# Patient Record
Sex: FEMALE | Race: WHITE
[De-identification: names, ages, dates, MRNs, and addresses within clinical notes are randomized per-mention and may not be internally consistent; named-entity substitution may affect disease eponyms.]

---

## 2022-03-06 ENCOUNTER — HOSPITAL ENCOUNTER (EMERGENCY)
Dept: HOSPITAL 26 - MED | Age: 22
Discharge: HOME | End: 2022-03-06
Payer: MEDICAID

## 2022-03-06 VITALS — WEIGHT: 150 LBS | HEIGHT: 66 IN | BODY MASS INDEX: 24.11 KG/M2

## 2022-03-06 VITALS — SYSTOLIC BLOOD PRESSURE: 122 MMHG | DIASTOLIC BLOOD PRESSURE: 81 MMHG

## 2022-03-06 DIAGNOSIS — Z88.8: ICD-10-CM

## 2022-03-06 DIAGNOSIS — L03.115: ICD-10-CM

## 2022-03-06 DIAGNOSIS — Z79.899: ICD-10-CM

## 2022-03-06 DIAGNOSIS — E06.3: ICD-10-CM

## 2022-03-06 DIAGNOSIS — L02.415: Primary | ICD-10-CM

## 2022-03-06 NOTE — NUR
20 y/o female presents to ed with c/o right hip abscess that started 6 days 
ago. pt was seen by dermotologist and was informed to do warm compresses. pt 
states since warm compress, abscess has opened and started draining, upon 
assessment, pt has small open wound with thick white/yellow discharge. no 
bleeding or edema around site. denies nausea, vomiting, diarrhea. skin is 
pink/warm/dry. a&o x4 with even and steady gait. lungs clear bl, heart rate 
even and regular. pt denies any fever, cp, sob, or cough at this time. pt 
states pain is 4/10 at this time. vss. patient positioned for comfort. hob 
elevated. bed down. ermd made aware of pt.



pmh: hashimoto, Postural orthostatic tachycardia syndrome (POTS), fibromyalgia

allergy: bee stings, APRI

## 2022-03-06 NOTE — NUR
Patient discharged with v/s stable. Written and verbal after care instructions 
given and explained. 

Patient alert, oriented and verbalized understanding of instructions. 
Ambulatory with steady gait. All questions addressed prior to discharge. ID 
band removed. Patient advised to follow up with PMD. Rx of sulfamethoxazole 
(sent) given. Patient educated on indication of medication including possible 
reaction and side effects. Opportunity to ask questions provided and answered.

## 2022-11-10 ENCOUNTER — HOSPITAL ENCOUNTER (EMERGENCY)
Dept: HOSPITAL 26 - MED | Age: 22
Discharge: HOME | End: 2022-11-10
Payer: MEDICAID

## 2022-11-10 VITALS — DIASTOLIC BLOOD PRESSURE: 72 MMHG | SYSTOLIC BLOOD PRESSURE: 113 MMHG

## 2022-11-10 VITALS — BODY MASS INDEX: 25.39 KG/M2 | HEIGHT: 66 IN | WEIGHT: 158 LBS

## 2022-11-10 DIAGNOSIS — M79.671: Primary | ICD-10-CM

## 2022-11-10 DIAGNOSIS — Z79.899: ICD-10-CM

## 2022-11-10 DIAGNOSIS — Z88.8: ICD-10-CM

## 2022-11-10 NOTE — NUR
22/F WALKED IN C/O RIGHT FOOT PAIN ONSET 5 DAYS. PT DENIES ANY FALL OR INJURY 
BUT STATES PT IS FREQUENTLY BOARD DIVING. AAO4, AMBULATORY, ROM INTACT, NO 
SWELLING OR DEFORMITY NOTED. 





pmh: POTS, fibromyalgia, hashimotos, sleep apnea

allergy: bee sting, desogestrel, ethinyl, estradiol

## 2023-04-18 ENCOUNTER — HOSPITAL ENCOUNTER (EMERGENCY)
Dept: HOSPITAL 26 - MED | Age: 23
Discharge: HOME | End: 2023-04-18
Payer: MEDICAID

## 2023-04-18 VITALS — HEIGHT: 65 IN | WEIGHT: 155 LBS | BODY MASS INDEX: 25.83 KG/M2

## 2023-04-18 VITALS — DIASTOLIC BLOOD PRESSURE: 72 MMHG | SYSTOLIC BLOOD PRESSURE: 122 MMHG

## 2023-04-18 VITALS — DIASTOLIC BLOOD PRESSURE: 93 MMHG | SYSTOLIC BLOOD PRESSURE: 132 MMHG

## 2023-04-18 DIAGNOSIS — Z88.8: ICD-10-CM

## 2023-04-18 DIAGNOSIS — Z91.030: ICD-10-CM

## 2023-04-18 DIAGNOSIS — J03.90: Primary | ICD-10-CM

## 2023-04-18 DIAGNOSIS — Z79.2: ICD-10-CM

## 2023-04-18 PROCEDURE — 99283 EMERGENCY DEPT VISIT LOW MDM: CPT

## 2023-04-18 NOTE — NUR
21 Y/O f PRESENTS WITH COLD SYMPTOMS X12 DAYS. PT STATED THROAT HAS BEEN 
INFLAMMED AND PAINFUL UPON SWALLOWING. PT STATED SHE FEELS FATIGUED AND WEAK 
WITH DIZZINESS. PT STATED SHE TOOK TYLENOL WITH SOME RELIEF AND ADVIL WITH NO 
RELIEF. PT STATED DIARRHEA X2DAYS WITH GREENISH COLOR. PT STATED HER MENSTRUAL 
CYCLE IS IRREGULAR AND HASNT HAD ONE SINCE JAN. PT IS A&OX4, SKIN INTACT. 



PMH- GI ISSUES. 

ALLERGIES- SEE CHART

## 2023-04-18 NOTE — NUR
Patient discharged with v/s stable. Written and verbal after care instructions 
given and explained. 

Patient alert, oriented and verbalized understanding of instructions. 
Ambulatory with steady gait. All questions addressed prior to discharge. ID 
band removed. Patient advised to follow up with PMD. Rx of 
aMOXICILLIN/POTASSIUM CLAV given. Opportunity to ask questions provided and 
answered.